# Patient Record
Sex: MALE | Race: ASIAN | Employment: FULL TIME | URBAN - METROPOLITAN AREA
[De-identification: names, ages, dates, MRNs, and addresses within clinical notes are randomized per-mention and may not be internally consistent; named-entity substitution may affect disease eponyms.]

---

## 2020-08-25 ENCOUNTER — APPOINTMENT (OUTPATIENT)
Dept: MRI IMAGING | Facility: CLINIC | Age: 55
End: 2020-08-25
Attending: FAMILY MEDICINE

## 2020-08-25 ENCOUNTER — HOSPITAL ENCOUNTER (EMERGENCY)
Facility: CLINIC | Age: 55
Discharge: HOME | End: 2020-08-25
Attending: FAMILY MEDICINE | Admitting: FAMILY MEDICINE

## 2020-08-25 ENCOUNTER — APPOINTMENT (OUTPATIENT)
Dept: CT IMAGING | Facility: CLINIC | Age: 55
End: 2020-08-25
Attending: FAMILY MEDICINE

## 2020-08-25 VITALS
OXYGEN SATURATION: 99 % | WEIGHT: 202.4 LBS | SYSTOLIC BLOOD PRESSURE: 141 MMHG | TEMPERATURE: 97.9 F | HEART RATE: 68 BPM | DIASTOLIC BLOOD PRESSURE: 104 MMHG | RESPIRATION RATE: 20 BRPM

## 2020-08-25 DIAGNOSIS — R20.2 FACIAL PARESTHESIA: ICD-10-CM

## 2020-08-25 LAB
ANION GAP SERPL CALCULATED.3IONS-SCNC: 4 MMOL/L (ref 3–14)
APTT PPP: 31 SEC (ref 22–37)
B-HCG FREE SERPL-ACNC: 1 [IU]/L (ref 0.86–1.14)
BASOPHILS # BLD AUTO: 0 10E9/L (ref 0–0.2)
BASOPHILS NFR BLD AUTO: 0.7 %
BUN SERPL-MCNC: 14 MG/DL (ref 7–30)
CALCIUM SERPL-MCNC: 8.8 MG/DL (ref 8.5–10.1)
CHLORIDE SERPL-SCNC: 111 MMOL/L (ref 94–109)
CO2 SERPL-SCNC: 29 MMOL/L (ref 20–32)
CREAT SERPL-MCNC: 0.91 MG/DL (ref 0.66–1.25)
DIFFERENTIAL METHOD BLD: ABNORMAL
EOSINOPHIL # BLD AUTO: 0.2 10E9/L (ref 0–0.7)
EOSINOPHIL NFR BLD AUTO: 4.2 %
ERYTHROCYTE [DISTWIDTH] IN BLOOD BY AUTOMATED COUNT: 13.4 % (ref 10–15)
GFR SERPL CREATININE-BSD FRML MDRD: >90 ML/MIN/{1.73_M2}
GLUCOSE BLDC GLUCOMTR-MCNC: 106 MG/DL (ref 70–99)
GLUCOSE SERPL-MCNC: 109 MG/DL (ref 70–99)
HCT VFR BLD AUTO: 46.3 % (ref 40–53)
HGB BLD-MCNC: 14.7 G/DL (ref 13.3–17.7)
IMM GRANULOCYTES # BLD: 0 10E9/L (ref 0–0.4)
IMM GRANULOCYTES NFR BLD: 0.3 %
INTERPRETATION ECG - MUSE: NORMAL
LYMPHOCYTES # BLD AUTO: 1.7 10E9/L (ref 0.8–5.3)
LYMPHOCYTES NFR BLD AUTO: 29.6 %
MCH RBC QN AUTO: 26.2 PG (ref 26.5–33)
MCHC RBC AUTO-ENTMCNC: 31.7 G/DL (ref 31.5–36.5)
MCV RBC AUTO: 83 FL (ref 78–100)
MONOCYTES # BLD AUTO: 0.4 10E9/L (ref 0–1.3)
MONOCYTES NFR BLD AUTO: 6.6 %
NEUTROPHILS # BLD AUTO: 3.4 10E9/L (ref 1.6–8.3)
NEUTROPHILS NFR BLD AUTO: 58.6 %
NRBC # BLD AUTO: 0 10*3/UL
NRBC BLD AUTO-RTO: 0 /100
PLATELET # BLD AUTO: 253 10E9/L (ref 150–450)
POTASSIUM SERPL-SCNC: 4.2 MMOL/L (ref 3.4–5.3)
RBC # BLD AUTO: 5.61 10E12/L (ref 4.4–5.9)
SODIUM SERPL-SCNC: 144 MMOL/L (ref 133–144)
TROPONIN I BLD-MCNC: 0 UG/L (ref 0–0.08)
WBC # BLD AUTO: 5.8 10E9/L (ref 4–11)

## 2020-08-25 PROCEDURE — 70549 MR ANGIOGRAPH NECK W/O&W/DYE: CPT

## 2020-08-25 PROCEDURE — 85610 PROTHROMBIN TIME: CPT

## 2020-08-25 PROCEDURE — 99285 EMERGENCY DEPT VISIT HI MDM: CPT | Mod: 25 | Performed by: FAMILY MEDICINE

## 2020-08-25 PROCEDURE — 25000128 H RX IP 250 OP 636: Performed by: FAMILY MEDICINE

## 2020-08-25 PROCEDURE — 70544 MR ANGIOGRAPHY HEAD W/O DYE: CPT

## 2020-08-25 PROCEDURE — 25800030 ZZH RX IP 258 OP 636: Performed by: FAMILY MEDICINE

## 2020-08-25 PROCEDURE — 00000146 ZZHCL STATISTIC GLUCOSE BY METER IP

## 2020-08-25 PROCEDURE — 70450 CT HEAD/BRAIN W/O DYE: CPT

## 2020-08-25 PROCEDURE — 25500064 ZZH RX 255 OP 636: Performed by: FAMILY MEDICINE

## 2020-08-25 PROCEDURE — 99291 CRITICAL CARE FIRST HOUR: CPT | Mod: 25 | Performed by: FAMILY MEDICINE

## 2020-08-25 PROCEDURE — 25000125 ZZHC RX 250: Performed by: FAMILY MEDICINE

## 2020-08-25 PROCEDURE — 70496 CT ANGIOGRAPHY HEAD: CPT

## 2020-08-25 PROCEDURE — 70553 MRI BRAIN STEM W/O & W/DYE: CPT

## 2020-08-25 PROCEDURE — 93010 ELECTROCARDIOGRAM REPORT: CPT | Mod: Z6 | Performed by: FAMILY MEDICINE

## 2020-08-25 PROCEDURE — 85730 THROMBOPLASTIN TIME PARTIAL: CPT | Performed by: FAMILY MEDICINE

## 2020-08-25 PROCEDURE — A9585 GADOBUTROL INJECTION: HCPCS | Performed by: FAMILY MEDICINE

## 2020-08-25 PROCEDURE — 96361 HYDRATE IV INFUSION ADD-ON: CPT | Performed by: FAMILY MEDICINE

## 2020-08-25 PROCEDURE — 96360 HYDRATION IV INFUSION INIT: CPT | Mod: 59 | Performed by: FAMILY MEDICINE

## 2020-08-25 PROCEDURE — 93005 ELECTROCARDIOGRAM TRACING: CPT | Performed by: FAMILY MEDICINE

## 2020-08-25 PROCEDURE — 84484 ASSAY OF TROPONIN QUANT: CPT

## 2020-08-25 PROCEDURE — 80048 BASIC METABOLIC PNL TOTAL CA: CPT | Performed by: FAMILY MEDICINE

## 2020-08-25 PROCEDURE — 85025 COMPLETE CBC W/AUTO DIFF WBC: CPT | Performed by: FAMILY MEDICINE

## 2020-08-25 RX ORDER — IOPAMIDOL 755 MG/ML
100 INJECTION, SOLUTION INTRAVASCULAR ONCE
Status: COMPLETED | OUTPATIENT
Start: 2020-08-25 | End: 2020-08-25

## 2020-08-25 RX ORDER — LISINOPRIL 20 MG/1
20 TABLET ORAL DAILY
COMMUNITY

## 2020-08-25 RX ORDER — GADOBUTROL 604.72 MG/ML
10 INJECTION INTRAVENOUS ONCE
Status: COMPLETED | OUTPATIENT
Start: 2020-08-25 | End: 2020-08-25

## 2020-08-25 RX ADMIN — IOPAMIDOL 70 ML: 755 INJECTION, SOLUTION INTRAVENOUS at 09:55

## 2020-08-25 RX ADMIN — SODIUM CHLORIDE 100 ML: 9 INJECTION, SOLUTION INTRAVENOUS at 11:38

## 2020-08-25 RX ADMIN — SODIUM CHLORIDE 100 ML: 9 INJECTION, SOLUTION INTRAVENOUS at 10:52

## 2020-08-25 RX ADMIN — SODIUM CHLORIDE 500 ML: 9 INJECTION, SOLUTION INTRAVENOUS at 10:22

## 2020-08-25 RX ADMIN — SODIUM CHLORIDE 80 ML: 9 INJECTION, SOLUTION INTRAVENOUS at 09:55

## 2020-08-25 RX ADMIN — GADOBUTROL 9.2 ML: 604.72 INJECTION INTRAVENOUS at 10:52

## 2020-08-25 NOTE — DISCHARGE INSTRUCTIONS
Thank you for choosing [facility].     Please closely monitor for further symptoms. Return to the Emergency Department if you develop any new or worsening signs or symptoms.    If you received any opiate pain medications or sedatives during your visit, please do not drive for at least 8 hours.     Labs, cultures or final xray interpretations may still need to be reviewed.  We will call you if your plan of care needs to be changed.    Start taking 1 aspirin daily.  As discussed, please refrain from any further use of methamphetamine as this can cause unstable elevated blood pressure and other significant physical consequences.  If you desire any assistance in this regard call [facility] services, [phone].  Please follow up with your primary care physician or clinic in the next 7 to 10 days for recheck.

## 2020-08-25 NOTE — ED PROVIDER NOTES
[facility] EMERGENCY DEPARTMENT ([facility])     August 25, 2020    History     Chief Complaint   Patient presents with     Slurred Speech     Pt reports facial numbness and slurred speech at 0630     The history is provided by the patient and medical records.     Eddi Mdeley is a 55 year old male with a PMH for HTN, high cholesterol and prediabetes who presents to the ED with complaint of facial numbness and slurred speech.    Patient reports facial numbness starting from his left sideburn beginning as soon as he woke up at 0630. Patient notes slight difficulty moving his face. He states he grinds his teeth at night usually but this is different than jaw pain. Patient reports a headache while working on his computer prior to visit today. Patient spoke to his PCP who recommended blinking and he noticed a difference in his face prompting his visit. He states his vision feels a little blurry and he is having some trouble blinking his left eye. He notes his upper and lower extremities feel normal. He notes some minor neck soreness that he attributes to his bed. He reports using methamphetamine over the weekend on Saturday. He denies drinking or smoking recently.     PAST MEDICAL HISTORY:   Past Medical History:   Diagnosis Date     Asymptomatic human immunodeficiency virus (HIV) infection status (H)     on Genvoya     Hypertension      Substance abuse (H)     occassional meth       PAST SURGICAL HISTORY: History reviewed. No pertinent surgical history.    Past medical history, past surgical history, medications, and allergies were reviewed with the patient. Additional pertinent items: None    FAMILY HISTORY: History reviewed. No pertinent family history.    SOCIAL HISTORY:   Social History     Tobacco Use     Smoking status: Never Smoker     Smokeless tobacco: Never Used   Substance Use Topics     Alcohol use: Yes     Comment: social     Social history was reviewed with the patient. Additional pertinent  items: None      Patient's Medications   New Prescriptions    No medications on file   Previous Medications    GENVOYA [phone] MG TABLET    Take 1 tablet by mouth daily (with breakfast)    LISINOPRIL (ZESTRIL) 20 MG TABLET    Take 20 mg by mouth daily    OXYCODONE (ROXICODONE) 5 MG IMMEDIATE RELEASE TABLET    Take 1 tablet (5 mg) by mouth every 6 hours as needed for pain   Modified Medications    No medications on file   Discontinued Medications    No medications on file        No Known Allergies     Review of Systems   ROS: 14 point ROS neg other than the symptoms noted above in the HPI.    Physical Exam   BP: (!) 150/93  Pulse: 77  Temp: 97.9  F (36.6  C)  Resp: 16  Weight: 91.8 kg (202 lb 6.4 oz)  SpO2: 97 %      Physical Exam  Vitals signs and nursing note reviewed.   Constitutional:       General: He is not in acute distress.     Appearance: He is not diaphoretic.   HENT:      Head: Atraumatic.   Eyes:      General: No visual field deficit or scleral icterus.     Pupils: Pupils are equal, round, and reactive to light.   Cardiovascular:      Rate and Rhythm: Normal rate and regular rhythm.      Heart sounds: Normal heart sounds. No murmur.   Pulmonary:      Effort: No respiratory distress.      Breath sounds: Normal breath sounds.   Abdominal:      General: Bowel sounds are normal.      Palpations: Abdomen is soft.      Tenderness: There is no abdominal tenderness.   Musculoskeletal:         General: No tenderness.   Skin:     General: Skin is warm.      Findings: No rash.   Neurological:      Mental Status: He is alert.      Cranial Nerves: Cranial nerve deficit (Complains of diminished sensation in the left cheek and forehead.  He is able to differentiate pinprick from dull sensation) present. No dysarthria or facial asymmetry.      Sensory: Sensory deficit (Left face only) present.      Motor: Motor function is intact.      Coordination: Coordination is intact. Romberg sign negative. Coordination  normal. Finger-Nose-Finger Test and Heel to Shin Test normal. Rapid alternating movements normal.      Deep Tendon Reflexes: Reflexes are normal and symmetric. Babinski sign absent on the right side. Babinski sign absent on the left side.   Psychiatric:         Attention and Perception: Attention normal.         Mood and Affect: Mood is anxious.         Speech: Speech normal.         Behavior: Behavior normal.         Thought Content: Thought content normal.         Cognition and Memory: Cognition normal.         ED Course   9:31 AM  The patient was seen and examined by Dr. Garcia  in Room ED01.       Procedures             EKG Interpretation:      Interpreted by Rasheed Garcia MD  Time reviewed: 0941  Symptoms at time of EKG: Facial numbness, stroke symptoms  Rhythm: normal sinus   Rate: normal  Axis: normal  Ectopy: none  Conduction: normal  ST Segments/ T Waves: No ST-T wave changes  Q Waves: none  Comparison to prior: No old EKG available    Clinical Impression: normal EKG          Critical Care Addendum    My initial assessment, based on my review of nursing observations, review of vital signs, focused history, physical exam, review of cardiac rhythm monitor, 12 lead ECG analysis and discussion with Neuro stroke service, established that Eddi Medley has focal neurologic abnormalities, which requires immediate intervention, and therefore he is critically ill.     After the initial assessment, the care team initiated multiple lab tests, initiated IV fluid administration and consulted with Neuro stroke service to provide stabilization care. Due to the critical nature of this patient, I reassessed nursing observations, vital signs, physical exam, review of cardiac rhythm monitor, 12 lead ECG analysis, interpretation of Imaging and neurologic status multiple times prior to his disposition.     Time also spent performing documentation, reviewing test results, discussion with consultants and coordination of  care.     Critical care time (excluding teaching time and procedures): 45 minutes.      The patient has stroke symptoms:         ED Stroke specific documentation           NIHSS PDF     Patient last known well time: 08/24/2020, 2300  ED Provider first to bedside at: 0930  CT Results received at: 1010    tPA:   Not given due to minor/isolated/quickly resolving symptoms.    If treating with tPA: Ensure SBP<185 and DBP<105 prior to treatment with IV tPA.  Administering IV tPA after treatment with IV labetalol, hydralazine, or nicardipine is reasonable once BP control is established.    Endovascular Retrieval:  Not initiated due to absence of proximal vessel occlusion    National Institutes of Health Stroke Scale (Baseline)  Time Performed: 0940     Score    Level of consciousness: (0)   Alert, keenly responsive    LOC questions: (0)   Answers both questions correctly    LOC commands: (0)   Performs both tasks correctly    Best gaze: (0)   Normal    Visual: (0)   No visual loss    Facial palsy: (0)   Normal symmetrical movements    Motor arm (left): (0)   No drift    Motor arm (right): (0)   No drift    Motor leg (left): (0)   No drift    Motor leg (right): (0)   No drift    Limb ataxia: (0)   Absent    Sensory: (1)   Mild to moderate sensory loss    Best language: (0)   Normal- no aphasia    Dysarthria: (0)   Normal    Extinction and inattention: (0)   No abnormality        Total Score:  1        Stroke Mimics were considered (including migraine headache, seizure disorder, hypoglycemia (or hyperglycemia), head or spinal trauma, CNS infection, Toxin ingestion and shock state (e.g. sepsis) .            National Institutes of Health Stroke Scale  Time Performed: 1100      Score    Level of consciousness: (0)   Alert, keenly responsive    LOC questions: (0)   Answers both questions correctly    LOC commands: (0)   Performs both tasks correctly    Best gaze: (0)   Normal    Visual: (0)   No visual loss    Facial palsy: (0)    Normal symmetrical movements    Motor arm (left): (0)   No drift    Motor arm (right): (0)   No drift    Motor leg (left): (0)   No drift    Motor leg (right): (0)   No drift    Limb ataxia: (0)   Absent    Sensory: (0)   Normal- no sensory loss    Best language: (0)   Normal- no aphasia    Dysarthria: (0)   Normal    Extinction and inattention: (0)   No abnormality        Total Score:  0                     Results for orders placed or performed during the hospital encounter of 08/25/20 (from the past 24 hour(s))   CBC with platelets differential   Result Value Ref Range    WBC 5.8 4.0 - 11.0 10e9/L    RBC Count 5.61 4.4 - 5.9 10e12/L    Hemoglobin 14.7 13.3 - 17.7 g/dL    Hematocrit 46.3 40.0 - 53.0 %    MCV 83 78 - 100 fl    MCH 26.2 (L) 26.5 - 33.0 pg    MCHC 31.7 31.5 - 36.5 g/dL    RDW 13.4 10.0 - 15.0 %    Platelet Count 253 150 - 450 10e9/L    Diff Method Automated Method     % Neutrophils 58.6 %    % Lymphocytes 29.6 %    % Monocytes 6.6 %    % Eosinophils 4.2 %    % Basophils 0.7 %    % Immature Granulocytes 0.3 %    Nucleated RBCs 0 0 /100    Absolute Neutrophil 3.4 1.6 - 8.3 10e9/L    Absolute Lymphocytes 1.7 0.8 - 5.3 10e9/L    Absolute Monocytes 0.4 0.0 - 1.3 10e9/L    Absolute Eosinophils 0.2 0.0 - 0.7 10e9/L    Absolute Basophils 0.0 0.0 - 0.2 10e9/L    Abs Immature Granulocytes 0.0 0 - 0.4 10e9/L    Absolute Nucleated RBC 0.0    Basic metabolic panel   Result Value Ref Range    Sodium 144 133 - 144 mmol/L    Potassium 4.2 3.4 - 5.3 mmol/L    Chloride 111 (H) 94 - 109 mmol/L    Carbon Dioxide 29 20 - 32 mmol/L    Anion Gap 4 3 - 14 mmol/L    Glucose 109 (H) 70 - 99 mg/dL    Urea Nitrogen 14 7 - 30 mg/dL    Creatinine 0.91 0.66 - 1.25 mg/dL    GFR Estimate >90 >60 mL/min/[1.73_m2]    GFR Estimate If Black >90 >60 mL/min/[1.73_m2]    Calcium 8.8 8.5 - 10.1 mg/dL   Partial thromboplastin time   Result Value Ref Range    PTT 31 22 - 37 sec   Troponin POCT   Result Value Ref Range    Troponin I 0.00 0.00  - 0.08 ug/L   Glucose by meter   Result Value Ref Range    Glucose 106 (H) 70 - 99 mg/dL   ISTAT INR POCT   Result Value Ref Range    ISTAT INR 1.0 0.86 - 1.14   CT Head w/o Contrast    Narrative    CT OF THE HEAD WITHOUT CONTRAST August 25, 2020 10:09 AM     HISTORY: Focal neuro deficit, < 6 hrs, stroke suspected.    TECHNIQUE: Axial CT images of the head from the skull base to the  vertex were acquired without IV contrast. Radiation dose for this scan  was reduced using automated exposure control, adjustment of the mA  and/or kV according to patient size, or iterative reconstruction  technique.     COMPARISON: None.    FINDINGS: The ventricles and basal cisterns are within normal limits  in configuration. There is no midline shift. There are no extra-axial  fluid collections. Gray-white differentiation is well maintained.    No intracranial hemorrhage, mass or recent infarct.    The visualized paranasal sinuses are well-aerated. There is no  mastoiditis. There are no fractures of the visualized bones.      Impression    IMPRESSION: Normal head CT.         FRANCESCO FREY MD   CTA Head Neck with Contrast    Narrative    CT ANGIOGRAM OF THE HEAD AND NECK WITHOUT AND WITH CONTRAST August 25, 2020 10:10 AM     HISTORY: Transient ischemic attack, initial exam.    TECHNIQUE: Precontrast localizing scans were followed by CT  angiography with an injection of 70 mL isovue 370 nonionic intravenous  contrast material with scans through the head and neck.  Images were  transferred to a separate 3-D workstation where multiplanar  reformations and 3-D images were created. Estimates of carotid  stenoses are made relative to the distal internal carotid artery  diameters except as noted. Radiation dose for this scan was reduced  using automated exposure control, adjustment of the mA and/or kV  according to patient size, or iterative reconstruction technique.    FINDINGS:   Neck CTA: The bilateral common carotid, bilateral cervical  internal  carotid and bilateral vertebral arteries are patent without stenosis.  There is normal variant arch origin of the left vertebral artery.    Head CTA: The basilar, bilateral intracranial distal internal carotid,  bilateral anterior cerebral, bilateral middle cerebral and bilateral  posterior cerebral arteries are patent and unremarkable. The anterior  communicating artery is patent.      Impression    IMPRESSION: Normal neck and head CTA.      FRANCESCO FREY MD   MR Head w/o Contrast Angiogram    Narrative    MR ANGIOGRAM OF THE HEAD WITHOUT CONTRAST   8/25/2020 11:18 AM     COMPARISON: None.    HISTORY: Headache and left-sided facial numbness.    TECHNIQUE:  3D time-of-flight MR angiogram of the head without  contrast. MIP reconstruction of all MR angiographic data was  performed.    FINDINGS:  The bilateral distal internal carotid, basilar, bilateral  anterior cerebral, bilateral middle cerebral and bilateral posterior  cerebral arteries are patent and unremarkable with no evidence for  cerebral artery stenosis or aneurysm. The anterior communicating  artery is patent and unremarkable.       Impression    IMPRESSION:  Normal MR angiogram of the head.     FRANCESCO FREY MD   MRA Angiogram Neck w/o & w Contrast    Narrative    MRA NECK WITHOUT AND WITH CONTRAST August 25, 2020 11:42 AM     HISTORY: Headache and left-sided facial numbness.    TECHNIQUE: 2D time-of-flight MR angiogram of the neck without contrast  and 3D MR angiogram of the neck with 9.2mL Gadavist gadolinium IV  contrast. MIP reconstruction of all MR angiographic data was  performed. Estimates of carotid stenoses are made relative to the  distal internal carotid artery diameters except as noted.    COMPARISON: Head and neck CT angiogram same day.    FINDINGS:    Carotids: The common carotid arteries bilaterally are widely patent.  The cervical internal carotid arteries bilaterally are patent without  stenosis.    Vertebrals: The vertebral  arteries bilaterally are patent without  stenosis and demonstrate antegrade flow.    Aortic arch: The arteries as they arise from the aortic arch are  normally arranged with no evidence for stenosis.      Impression    IMPRESSION: Normal MR angiogram of the neck.    MR Brain w/o & w Contrast    Narrative    MRI OF THE BRAIN WITHOUT AND WITH CONTRAST August 25, 2020 11:42 AM     HISTORY: Headache and left-sided facial numbness.     TECHNIQUE: Multisequence, multiplanar MRI images of the brain were  acquired before and after the administration of IV gadolinium (9.2mL  Gadavist).    COMPARISON: Head CT 8/25/2020.    FINDINGS: The ventricles and basal cisterns are normal in  configuration. There is no midline shift. There are no extra-axial  fluid collections. Gray-white differentiation is well maintained.  There is no evidence for stroke or acute intracranial hemorrhage.  There is no abnormal contrast enhancement in the brain or its  coverings.    There is no sinusitis or mastoiditis.      Impression    IMPRESSION: Normal brain MRI.        Medications   sodium chloride 0.9 % bag 500mL for CT scan flush use (100 mLs Intravenous Negative 8/25/20 1138)   0.9% sodium chloride BOLUS (0 mLs Intravenous Stopped 8/25/20 1213)   iopamidol (ISOVUE-370) solution 100 mL (70 mLs Intravenous Given 8/25/20 0955)   0.9% sodium chloride BOLUS (0 mLs Intravenous Stopped 8/25/20 1055)   gadobutrol (GADAVIST) injection 10 mL (9.2 mLs Intravenous Given 8/25/20 1052)             Assessments & Plan (with Medical Decision Making)   55-year-old man with a history of hypertension, HIV disease, and methamphetamine abuse, presenting with the onset of left facial sensory disturbance upon awakening at 06 30 this morning.  Initial differential diagnosis includes acute intracranial event such as bleed or stroke, CNS neoplasm, CNS infection or other complication of HIV disease, trigeminal neuralgia, electrolyte derangement, toxin mediated sensory  disturbance due to elevated levels or withdrawal, vasculitis, peripheral neuropathy, complex migraine, anxiety.  On his initial exam, his only objective finding is subjective report of decreased sensation in the left face involving both the cheek and the forehead.  He is still able to differentiate sharp from dull.  The remainder of his complete neurologic exam was normal with NIH stroke scale of 1.  We did obtain CT head and CTA and discussed with neuro stroke team.  These studies reported as normal.  I did not feel he was a candidate for TPA due to the minimal objective findings, which I did not feel merited the risks.  At the time of his second stroke exam his symptoms had completely resolved.  His labs are unremarkable as is his EKG.  In discussion with neuro stroke we did obtain a stroke protocol MRI which is also reported as completely normal.  Labs also very unremarkable.  All of his symptoms have now resolved.  I see no evidence of any acute life-threatening etiology of his symptoms, cannot completely rule out sensory TIA in a facial only distribution, however this seems unlikely.  Symptoms could be due to transient trigeminal nerve irritation, versus complex migraine (he did have a associated headache which is now resolved), anxiety reaction related to recent methamphetamine use is also a possibility.  Based on the clinical findings and the entire clinical scenario, the patient appears stable at this time to be treated symptomatically, and to follow-up as an outpatient for any further evaluation and treatment.  Discussed expected course, need for follow up, and indications for return with the patient.  See discharge instructions.      I have reviewed the nursing notes.    I have reviewed the findings, diagnosis, plan and need for follow up with the patient.    New Prescriptions    No medications on file       Final diagnoses:   Facial paresthesia     I, Abdon Acuña, am serving as a trained medical scribe  to document services personally performed by Rasheed Garcia MD, based on the provider's statements to me.     I, Rasheed Garcia MD, was physically present and have reviewed and verified the accuracy of this note documented by Abdon Acuña.    8/25/2020   [facility], [place], EMERGENCY DEPARTMENT     Rasheed Garcia MD  08/25/20 6840

## 2020-08-25 NOTE — ED AVS SNAPSHOT
[facility], [place], Emergency Department  [address]  [place] [address]  Phone:  [phone]  Fax:  [phone]                                    Eddi Medely   MRN: [id number]    Department:  [facility], Emergency Department   Date of Visit:  8/25/2020           After Visit Summary Signature Page    I have received my discharge instructions, and my questions have been answered. I have discussed any challenges I see with this plan with the nurse or doctor.    ..........................................................................................................................................  Patient/Patient Representative Signature      ..........................................................................................................................................  Patient Representative Print Name and Relationship to Patient    ..................................................               ................................................  Date                                   Time    ..........................................................................................................................................  Reviewed by Signature/Title    ...................................................              ..............................................  Date                                               Time          22EPIC Rev 08/18